# Patient Record
Sex: MALE | Race: WHITE | NOT HISPANIC OR LATINO | URBAN - METROPOLITAN AREA
[De-identification: names, ages, dates, MRNs, and addresses within clinical notes are randomized per-mention and may not be internally consistent; named-entity substitution may affect disease eponyms.]

---

## 2017-01-07 ENCOUNTER — OFFICE VISIT - HEALTHEAST (OUTPATIENT)
Dept: FAMILY MEDICINE | Facility: CLINIC | Age: 22
End: 2017-01-07

## 2017-01-07 DIAGNOSIS — Z48.02 ENCOUNTER FOR REMOVAL OF SUTURES: ICD-10-CM

## 2017-10-14 ENCOUNTER — OFFICE VISIT - HEALTHEAST (OUTPATIENT)
Dept: FAMILY MEDICINE | Facility: CLINIC | Age: 22
End: 2017-10-14

## 2017-10-14 DIAGNOSIS — J18.9 CAP (COMMUNITY ACQUIRED PNEUMONIA): ICD-10-CM

## 2017-10-14 DIAGNOSIS — R05.9 COUGH: ICD-10-CM

## 2021-05-30 VITALS — BODY MASS INDEX: 19.73 KG/M2 | WEIGHT: 153.7 LBS

## 2021-05-31 VITALS — BODY MASS INDEX: 21.31 KG/M2 | WEIGHT: 166 LBS

## 2021-06-08 NOTE — PROGRESS NOTES
Name: Krish Durham  Age: 22 y.o.  Gender: male  : 1995  Date of Encounter: 2017      HPI:  Krish Durham is a 22 y.o.  male who presents to the clinic companied by dad and girlfriend for suture removal.  Patient reports nose fracture which was recently repaired by using a rib graft.  He comes in today to have sutures removed from his chest wall where the rib graft was obtained.  Patient reports they were dissolving sutures but the 2 ties on either end are not dissolving.  He contacted his surgeon's office and they recommended he come in to have the knots removed.  Denies fever, redness or drainage from the wound site.  Feels the wound has been healing well.    Current Medication:   Medications reviewed and updated.    Current Outpatient Prescriptions:      omeprazole (PRILOSEC) 20 MG capsule, Take 1 capsule (20 mg total) by mouth 2 (two) times a day., Disp: 180 capsule, Rfl: 2    Past Med / Surg History:  Past Medical History   Diagnosis Date     Concussion  &      Deviated septum      Past Surgical History   Procedure Laterality Date     External ear surgery Left 2012     Behind Left Ear     Nasal septum surgery         Fam / Soc History:  Family History   Problem Relation Age of Onset     No Medical Problems Mother      No Medical Problems Father      No Medical Problems Sister      No Medical Problems Brother      Breast cancer Maternal Grandmother      Stroke Maternal Grandfather      Heart disease Paternal Grandfather      Heart Valve replacement     Social History     Social History     Marital status: Single     Spouse name: N/A     Number of children: N/A     Years of education: N/A     Occupational History     Not on file.     Social History Main Topics     Smoking status: Never Smoker     Smokeless tobacco: Never Used     Alcohol use No     Drug use: No     Sexual activity: No     Other Topics Concern     Not on file     Social History Narrative       ROS:  14 point review of systems  unremarkable except as mentioned in HPI    Objective:  Vitals:   Visit Vitals     /78 (Patient Site: Right Arm, Patient Position: Sitting, Cuff Size: Adult Regular)     Pulse 94     Temp 97.6  F (36.4  C) (Oral)     Wt 153 lb 11.2 oz (69.7 kg)     SpO2 97%     BMI 19.73 kg/m2       Gen: Alert, awake, well appearing  Skin: Anterior chest wall inferior and lateral to the nipple there is a horizontal incision measuring approximately 3 cm in size.  Incision healing well without tenderness, redness or drainage.  Suture knots are exposed on either end of the incision.  Mental Status: Alert, oriented, in no distress. Mood and affect appropriate.    Assessment:  Suture removal    Plan: Suture knots were removed on either end of the incision.  Structured on wound care.  Follow-up as needed.      Radha Siegel MD  1/13/2017

## 2021-06-13 NOTE — PROGRESS NOTES
Chief Complaint   Patient presents with     Fever     Cough is getting worst. 1x week. Pt is still not feeling any better after finishing his Z-pack 2x weeks.        HPI    Patient is here for worsening cough with persistent shortness of breath after he was diagnosed with pneumonia and treated with Z-Clotilde 2 wks ago in [place]. He said his fever (initially 103) improved and body aches improved. However, the cough worsens, still productive. His shortness of breath remains unchanged. No significant nasal congestion, sore throat, nausea, vomiting. He goes to school in [place] and returned home (by car) one day ago to visit his parents. He is generally healthy.    ROS: Pertinent ROS noted in HPI.     No Known Allergies    Patient Active Problem List   Diagnosis     Deviated Nasal Septum (Acquired)     GERD (gastroesophageal reflux disease)       Family History   Problem Relation Age of Onset     No Medical Problems Mother      No Medical Problems Father      No Medical Problems Sister      No Medical Problems Brother      Breast cancer Maternal Grandmother      Stroke Maternal Grandfather      Heart disease Paternal Grandfather      Heart Valve replacement       Social History     Social History     Marital status: Single     Spouse name: N/A     Number of children: N/A     Years of education: N/A     Occupational History     Not on file.     Social History Main Topics     Smoking status: Never Smoker     Smokeless tobacco: Never Used     Alcohol use No     Drug use: No     Sexual activity: No     Other Topics Concern     Not on file     Social History Narrative         Objective:    Vitals:    10/14/17 1406   BP: 120/64   Pulse: 100   Resp: 18   Temp: 98  F (36.7  C)   SpO2: 93%       Gen:NAD  Oropharynx: normal  Ears:normal TMs and canals  CV: RRR, no M, R, G  Pulm: CTAB, normal effort    Xr Chest Pa And Lateral    Result Date: 10/14/2017  XR CHEST PA AND LATERAL 10/14/2017 2:25 PM INDICATION: cough with shortness of breath,  was recently diagnosed with pneumonia with CXR [place], but symptoms not improving. COMPARISON: None. FINDINGS: There is minimal infiltrate at the right lung base posteriorly consistent with a small right lower lobe pneumonia. Left lung is clear. There is no pneumothorax or pleural effusion. The heart size and pulmonary vascularity are normal. NOTE: ABNORMAL REPORT THE DICTATION ABOVE DESCRIBES AN ABNORMALITY FOR WHICH FOLLOW-UP IS NEEDED. . This report was electronically interpreted by: Dr. Ernie De La Garza MD ON 10/14/2017 at 14:30      CXR - ordered and reviewed by me, RLL infiltrates c/w pneumonia, discussed during visit.        CAP (community acquired pneumonia) - I discussed side effects of Levaquin with patient and his mother during visit.   -     levoFLOXacin (LEVAQUIN) 750 MG tablet; Take 1 tablet (750 mg total) by mouth daily for 5 days.  -     albuterol (PROAIR HFA;PROVENTIL HFA;VENTOLIN HFA) 90 mcg/actuation inhaler; Inhale 2 puffs every 6 (six) hours as needed.    Cough  -     XR Chest PA and Lateral  -     albuterol nebulizer solution 3 mL (PROVENTIL); Take 3 mL by nebulization once.  -     benzonatate (TESSALON PERLES) 100 MG capsule; Take 1 capsule (100 mg total) by mouth every 6 (six) hours as needed for cough.  -     albuterol (PROAIR HFA;PROVENTIL HFA;VENTOLIN HFA) 90 mcg/actuation inhaler; Inhale 2 puffs every 6 (six) hours as needed.      Patient reported feeling better after neb tx. F/u next week with primary care for recheck.